# Patient Record
Sex: MALE | Race: WHITE | NOT HISPANIC OR LATINO | URBAN - METROPOLITAN AREA
[De-identification: names, ages, dates, MRNs, and addresses within clinical notes are randomized per-mention and may not be internally consistent; named-entity substitution may affect disease eponyms.]

---

## 2017-05-04 ENCOUNTER — AMBULATORY - HEALTHEAST (OUTPATIENT)
Dept: CARDIOLOGY | Facility: CLINIC | Age: 62
End: 2017-05-04

## 2017-05-05 ENCOUNTER — OFFICE VISIT - HEALTHEAST (OUTPATIENT)
Dept: CARDIOLOGY | Facility: CLINIC | Age: 62
End: 2017-05-05

## 2017-05-05 ENCOUNTER — AMBULATORY - HEALTHEAST (OUTPATIENT)
Dept: CARDIOLOGY | Facility: CLINIC | Age: 62
End: 2017-05-05

## 2017-05-05 ENCOUNTER — COMMUNICATION - HEALTHEAST (OUTPATIENT)
Dept: TELEHEALTH | Facility: CLINIC | Age: 62
End: 2017-05-05

## 2017-05-05 DIAGNOSIS — I48.0 PAROXYSMAL ATRIAL FIBRILLATION (H): ICD-10-CM

## 2017-05-05 DIAGNOSIS — I10 ESSENTIAL HYPERTENSION WITH GOAL BLOOD PRESSURE LESS THAN 140/90: ICD-10-CM

## 2017-05-05 ASSESSMENT — MIFFLIN-ST. JEOR: SCORE: 1819.98

## 2019-04-01 ENCOUNTER — TELEPHONE (OUTPATIENT)
Dept: OTHER | Facility: CLINIC | Age: 64
End: 2019-04-01

## 2019-04-01 NOTE — TELEPHONE ENCOUNTER
4/1/2019      [facility] MEDICARE: ON-BOARDED   ANNUAL WELLNESS/ PHYSICAL: COMPLETED  SPECIALIST: NONE          Outreach ,  David Elise

## 2019-10-22 RX ORDER — MULTIPLE VITAMINS W/ MINERALS TAB 9MG-400MCG
1 TAB ORAL DAILY
Status: SHIPPED | COMMUNITY
Start: 2019-10-22

## 2019-10-22 ASSESSMENT — MIFFLIN-ST. JEOR
SCORE: 1720.19
SCORE: 1720.19

## 2019-10-25 ENCOUNTER — SURGERY - HEALTHEAST (OUTPATIENT)
Dept: SURGERY | Facility: CLINIC | Age: 64
End: 2019-10-25

## 2019-10-25 ENCOUNTER — HOSPITAL ENCOUNTER (OUTPATIENT)
Dept: SURGERY | Facility: CLINIC | Age: 64
Discharge: HOME | End: 2019-10-25
Attending: ORTHOPAEDIC SURGERY | Admitting: ORTHOPAEDIC SURGERY

## 2019-10-25 ENCOUNTER — ANESTHESIA - HEALTHEAST (OUTPATIENT)
Dept: SURGERY | Facility: CLINIC | Age: 64
End: 2019-10-25

## 2019-10-25 DIAGNOSIS — M51.26 LUMBAR HERNIATED DISC: ICD-10-CM

## 2019-10-25 RX ORDER — OXYCODONE AND ACETAMINOPHEN 5; 325 MG/1; MG/1
1 TABLET ORAL EVERY 6 HOURS PRN
Qty: 20 TABLET | Refills: 0 | Status: SHIPPED | OUTPATIENT
Start: 2019-10-25

## 2019-10-25 RX ORDER — ALBUTEROL SULFATE 90 UG/1
2 AEROSOL, METERED RESPIRATORY (INHALATION) EVERY 4 HOURS PRN
Status: SHIPPED | COMMUNITY
Start: 2011-11-01

## 2019-10-25 ASSESSMENT — MIFFLIN-ST. JEOR
SCORE: 1722.46
SCORE: 1722.46

## 2021-05-30 VITALS — BODY MASS INDEX: 27.59 KG/M2 | HEIGHT: 74 IN | WEIGHT: 215 LBS

## 2021-06-02 NOTE — OP NOTE
NAME:  Rico Peguero    DATE OF SERVICE: 10/25/2019    PREOPERATIVE DIAGNOSIS:  Left L5-S1 herniated nucleus pulposus.    POSTOPERATIVE DIAGNOSES:    1.  Left L5-S1 herniated nucleus pulposus.  2.  Axillary L5-S1 disk herniation.    PROCEDURES PERFORMED:  1.  Left L5-S1 microdiskectomy with excision of axillary disk herniation.  2.  Use of operative microscopy.    ATTENDING SURGEON:  Jose De Jesus Parr MD    ASSISTANT:  Todd Holter, PA-C.  Please note this procedure technically required an  assistant for the safety of the patient.  Everton Holter is an experienced PA in spinal  surgery.  His assistance was imperative and necessary to safely protect the  surrounding neural structures as I performed the decompressive phase of the  procedure.    ANESTHESIA:  General endotracheal anesthesia.    ESTIMATED BLOOD LOSS:  10 mL    PACKS AND DRAINS:  None.    COMPLICATIONS:  None.    CONDITION:  Stable.    DISPOSITION:  Postanesthesia care unit.    NARRATIVE:  Mr. Peguero is a pleasant 64-year-old gentleman who previously underwent a  bilateral microdiskectomies by Dr. Maicol Harkins in the past at L4-5.  He did well  from those procedures, recently developed symptoms radiating in the left lower  extremity consistent with the S1 dermatome.  An MRI demonstrated evidence of an  extruded disk herniation on the left at L5-S1.  A trial of conservative care was  attempted but unsuccessful and the patient opted for surgical intervention.  A  detailed description of the risks, benefits, treatment alternatives inherent to the  operation was explained in advance to the patient including but not limited to  failure to improve, cerebrospinal fluid leakage, hemorrhage, infection, permanent or  transient nerve root damage, need for future surgery, adjacent segment disease or  recurrence.  Despite these risks, he elected to proceed.  He was then seen by his  primary care physician and scheduled for surgery.    The patient was met in the preoperative  holding area by myself at the time the  consent was reviewed and signed.  The operative site was marked on the patient's  back.  He was then seen by the anesthesia service and escorted back to the operating  room.  Upon arrival in the operating room, patient was intubated by the anesthesia  service without complication and then flipped from supine to prone position on the  Maury frame with special attention paid to padding bony prominences or superficial  nerves.  His abdomen and genitals were found to be hanging free.  The patient's back  was then prepped and draped in normal sterile fashion.  At this point, a timeout was  called to ensure the proper procedure to be performed as well as the administration  of prophylactic antibiotics.  Once this was completed, the procedure began.  C-arm  fluoroscopic imaging was brought in the lateral plane and with lateral fluoroscopic  imaging, we advanced the spinal needle through the skin to approximately the level  of the L5-S1 disk space.  Once this was confirmed, skin was marked.  The needle was  removed.  We utilized a midline lumbar incision below the previous incision  overlying the L5-S1 interlaminar window. It was carried down through subcutaneous  tissue and underlying fat.  Deep fascia was incised longitudinally in the direction  of its fibers.  The METRx dilation tube system was then utilized to dilate the  paraspinal musculature to 22 mm in diameter and a 22 mm x 5 cm tube was called for  and affixed to the holding arm in standard fashion.  Its position was confirmed via  C-arm as well as the correct operative level, which was confirmed with the radiology  service at [facility].  The C-arm was then removed and the operating microscope was  draped and brought in.  Under operative microscopy, we identified the inferior  border of the hemilamina of L5.  We were able to detach the ligamentum flavum on the  undersurface of the L5 hemilamina and removed this in piecemeal  fashion with a 2 mm  Kerrison progressing cephalad to caudad removing just enough to allow adequate  exposure of the underlying thecal sac and nerve root.  Later in the procedure, a  small laminotomy at L5 was necessary, which was performed with a 2 mm Kerrison as  well.  We were able to sweep aside the epidural fat and localize the thecal sac as  well as the S1 nerve root which had been displaced laterally by the axillary disk  herniation which was semi-adherent to the dura. Utilizing meticulous dissection with  a Penfield retractor and ball-tip probe, we were able to safely remove this from the  axillary location and then remove the herniated disk fragment with a pituitary.   This allowed us to gain access to the lateral border of the thecal sac and we gently  retracted the nerve root in lateral to medial direction exposing the annular rent.   We were able to open this up with a 15 blade and then explored this with a ball-tip  probe.  Varying sizes were placed into the disk space no deeper than their jaws to  facilitate removal of any additional loosened fragments.  We were able to place a  pituitary rongeur into the disk space no deeper than its jaws.  We then irrigated  with normal saline and once again any small fragments of disk were removed.  At this  point, the nerve root regained its suppleness, could be easily transposed at least 1  cm in lateral to medial direction without obstruction.  There is no evidence of any  additional compressive lesion.  We then copiously irrigated with normal saline,  maintained hemostasis with bipolar electrocautery and thrombin paste, then removed  the retractor and closed in layers beginning with the deep fascia which was closed  with #1 Vicryl suture, subcutaneous tissues closed with 2-0 interrupted Vicryl  suture and skin was closed with running subcuticular 4-0 stitch.  The wound was then  cleaned and dried in normal fashion.  Steri-Strips and gauze applied.  The  drapes  were removed.  The patient was transferred from the prone to supine position at  which point he  was extubated by the anesthesia service without complication.  All  needle and sponge counts were correct at the end of the case.      YASMANY MINOR MD  mn  D 10/25/2019 13:25:47  T 10/25/2019 14:32:37  R 10/25/2019 14:32:37  [id number]        cc: YASMANY GANN MD

## 2021-06-02 NOTE — INTERVAL H&P NOTE
I have performed an assessment of the patient, as necessary, to update the patient's current status that may have changed since the prior History and Physical.  The History & Physical has been reviewed and no updates are needed.

## 2021-06-02 NOTE — PROGRESS NOTES
Pharmacy Note - Admission Medication History  Pertinent Provider Information: None   ______________________________________________________________________  Prior To Admission (PTA) med list completed and updated in EMR.     PTA Med List   Medication Sig Last Dose     albuterol (PROAIR HFA;PROVENTIL HFA;VENTOLIN HFA) 90 mcg/actuation inhaler Inhale 2 puffs every 4 (four) hours as needed for allergies. More than a month at Unknown time     amLODIPine (NORVASC) 5 MG tablet Take 5 mg by mouth daily. 10/24/2019 at Unknown time     aspirin 81 MG EC tablet Take 1 tablet (81 mg total) by mouth daily. 10/19/2019 at Unknown time     losartan-hydrochlorothiazide (HYZAAR) 100-25 mg per tablet Take 1 tablet by mouth daily. 10/24/2019 at Unknown time     multivitamin with minerals (THERA-M) 9 mg iron-400 mcg Tab tablet Take 1 tablet by mouth daily. Past Week at Unknown time     traZODone (DESYREL) 50 MG tablet Take  mg by mouth at bedtime.        10/24/2019 at Unknown time       Information source(s): Patient and Clinic records  Patient was asked about OTC/herbal products specifically.  PTA med list reflects this.  Based on the pharmacist s assessment, the PTA med list information appears reliable  Allergies were reviewed, assessed, and updated with the patient.    Patient does not use any multi-dose medications prior to admission.   Thank you for the opportunity to participate in the care of this patient.    Dillon Mckeon, PharmD     10/25/2019     9:42 AM

## 2021-06-02 NOTE — ANESTHESIA CARE TRANSFER NOTE
Last vitals:   Vitals:    10/25/19 1310   BP: 134/80   Pulse: (!) 102   Resp: 18   Temp: 36.6  C (97.9  F)   SpO2: 100%     Patient's level of consciousness is drowsy  Spontaneous respirations: yes  Maintains airway independently: yes  Dentition unchanged: yes  Oropharynx: oropharynx clear of all foreign objects    QCDR Measures:  ASA# 20 - Surgical Safety Checklist: WHO surgical safety checklist completed prior to induction    PQRS# 430 - Adult PONV Prevention: 4558F - Pt received => 2 anti-emetic agents (different classes) preop & intraop  ASA# 8 - Peds PONV Prevention: NA - Not pediatric patient, not GA or 2 or more risk factors NOT present  PQRS# 424 - Tara-op Temp Management: 4559F - At least one body temp DOCUMENTED => 35.5C or 95.9F within required timeframe  PQRS# 426 - PACU Transfer Protocol: - Transfer of care checklist used  ASA# 14 - Acute Post-op Pain: ASA14B - Patient did NOT experience pain >= 7 out of 10

## 2021-06-02 NOTE — BRIEF OP NOTE
Brief Operative Note    Name:  Rico Peguero  Location: [facility] Main OR  Procedure Date:  10/25/2019  PCP:  Rico Balderas MD      LEFT LUMBAR 5 SACRAL 1 MINIMALLY INVASIVE MICRODISCECTOMY (Left)    Pre-Procedure Diagnosis:    Acute low back pain [M54.5]     Post-Procedure Diagnosis:    * No post-op diagnosis entered *    Surgeon(s):  Jose De Jesus Parr MD    Findings:    axillary  HNP    Estimated Blood Loss:   10 mL from 10/25/2019 11:52 AM to 10/25/2019  1:01 PM    Specimens:    * No specimens in log *       Drains:        Implants:  * No implants in log *    Complications:    None    Jose De Jesus Parr     Date: 10/25/2019  Time: 1:01 PM

## 2021-06-02 NOTE — ANESTHESIA PREPROCEDURE EVALUATION
Anesthesia Evaluation        Airway   Mallampati: II  Neck ROM: full   Pulmonary     breath sounds clear to auscultation  (+) sleep apnea on no CPAP, ,   (-) asthma    ROS comment: Albuterol for cat allergies                         Cardiovascular   (+) hypertension, dysrhythmias, ,     (-) past MI, CAD, CABG/stent, angina, hypercholesterolemia  Rhythm: regular  Rate: normal,         Neuro/Psych    (+) neuromuscular disease,    (-) no seizures, no CVA    Comments: Sciatica left leg    Endo/Other - negative ROS      GI/Hepatic/Renal    (-) renal disease          Dental - normal exam                        Anesthesia Plan  Planned anesthetic: general endotracheal    ASA 3   Induction: intravenous   Anesthetic plan and risks discussed with: patient    Post-op plan: routine recovery

## 2021-06-02 NOTE — OR NURSING
Level confirmed by radiologist Dr. Carlin at 1228 to be at Lumbar 5-Sacral 1, consistent with surgical consent.

## 2021-06-02 NOTE — ANESTHESIA POSTPROCEDURE EVALUATION
Patient: Rico Peguero  LEFT LUMBAR 5 SACRAL 1 MINIMALLY INVASIVE MICRODISCECTOMY  Anesthesia type: general    Patient location: PACU  Last vitals:   Vitals Value Taken Time   /83 10/25/2019  2:00 PM   Temp 36.6  C (97.9  F) 10/25/2019  1:30 PM   Pulse 77 10/25/2019  2:12 PM   Resp 16 10/25/2019  2:00 PM   SpO2 96 % 10/25/2019  2:12 PM   Vitals shown include unvalidated device data.  Post vital signs: stable  Level of consciousness: awake and responds to simple questions  Post-anesthesia pain: pain controlled  Post-anesthesia nausea and vomiting: no  Pulmonary: unassisted, return to baseline  Cardiovascular: stable and blood pressure at baseline  Hydration: adequate  Anesthetic events: no    QCDR Measures:  ASA# 11 - Tara-op Cardiac Arrest: ASA11B - Patient did NOT experience unanticipated cardiac arrest  ASA# 12 - Tara-op Mortality Rate: ASA12B - Patient did NOT die  ASA# 13 - PACU Re-Intubation Rate: ASA13B - Patient did NOT require a new airway mgmt  ASA# 10 - Composite Anes Safety: ASA10A - No serious adverse event    Additional Notes:

## 2021-06-03 VITALS
WEIGHT: 193.5 LBS | BODY MASS INDEX: 24.83 KG/M2 | WEIGHT: 193.5 LBS | HEIGHT: 74 IN | BODY MASS INDEX: 24.83 KG/M2 | HEIGHT: 74 IN

## 2021-06-10 NOTE — PROGRESS NOTES
[facility]    Assessment/Plan:  Diagnoses and all orders for this visit:    Paroxysmal atrial fibrillation and now 6 years out from repeat ablation.  He has only had seconds where he feels a faster heart rate and then back in regular rhythm.  [id number] score of 1 and on aspirin full-strength aspirin.  To decrease to aspirin 81 mg p.o. daily.  To call if symptoms of recurrence of atrial fib.  Will follow in clinic as needed only.  -     aspirin 81 MG EC tablet; Take 1 tablet (81 mg total) by mouth daily.; Refill: 0    Essential hypertension with goal blood pressure less than 140/90 well-controlled.    ______________________________________________________________________    Subjective:    I had the opportunity to see Rico Peguero at the [facility]. Rico Peguero is a 62 y.o. male and here for EP follow-up regarding paroxysmal atrial fibrillation.  Rico Peguero has a known history of paroxysmal atrial fib and had pulmonary vein isolation ablation in 2005 and then repeat ablation in 2011.  He is done well since.  He denies any cardiac symptoms on questioning.  He was never able to tolerate CPAP but continues to sleep on his side or stomach.  ______________________________________________________________________    Problem List:  Patient Active Problem List   Diagnosis     Asthma     Obstructive Sleep Apnea     Essential hypertension     Paroxysmal Atrial Fibrillation     Medical History:  Past Medical History:   Diagnosis Date     Asthma     Created by Conversion      Hypertension     Created by Conversion      Obstructive Sleep Apnea     Positive sleep study in 2006 and didn't shmuel CPAP. Mostly side sleeping/       Paroxysmal Atrial Fibrillation     [id number] - VASc risk score = 1 (HTN)  Symptomatic. Failed rhythmol, amiodarone and sotalol in past.  PVI in 2005  Redo PVI Feb 2011(PVI +JODEE + CTI) Off AAD / warfain May 2011      Surgical History:  Past Surgical History:  "  Procedure Laterality Date     BACK SURGERY  10/2015    Initial surgery Nov 2014     NC ABLATE HEART DYSRHYTHM FOCUS      Description: Catheter Ablation Atrial Fibrillation;  Recorded: 04/22/2013;  Comments: 2005 non irrigated RF + SVC; ; Repeat PVI Feb 2011 (PVI reisolation + JODEE + roof + CTI lines)     Social History:  Social History   Substance Use Topics     Smoking status: Never Smoker     Smokeless tobacco: None     Alcohol use Yes      Comment: 2 drinks per month        Review of Systems: Review of Systems:   General: WNL  Eyes: WNL  Ears/Nose/Throat: WNL  Lungs: WNL  Heart: WNL  Stomach: WNL  Bladder: WNL  Muscle/Joints: WNL  Skin: WNL  Nervous System: WNL  Mental Health: WNL     Blood: WNL      Family History:  Family History   Problem Relation Age of Onset     Rheum arthritis Mother      Cirrhosis Father      Heart failure Father      Atrial fibrillation Sister      Atrial fibrillation Brother      Hypertension Brother          Allergies:  No Known Allergies  Medications:  Current Outpatient Prescriptions   Medication Sig Dispense Refill     amLODIPine (NORVASC) 5 MG tablet Take 5 mg by mouth daily.       losartan-hydrochlorothiazide (HYZAAR) 100-25 mg per tablet Take 1 tablet by mouth daily.       traZODone (DESYREL) 50 MG tablet Take  mg by mouth bedtime as needed for sleep.       aspirin 81 MG EC tablet Take 1 tablet (81 mg total) by mouth daily.  0     No current facility-administered medications for this visit.        Objective:   Vital signs:  /74 (Patient Site: Left Arm, Patient Position: Sitting, Cuff Size: Adult Regular)  Pulse 80  Resp 16  Ht 6' 2\" (1.88 m)  Wt 215 lb (97.5 kg)  BMI 27.6 kg/m2      Physical Exam:    GENERAL APPEARANCE: Alert, cooperative and in no acute distress.  HEENT: No scleral icterus. No Xanthelasma. Oral mucuos membranes pink and moist.  NECK: JVP Nl.   CHEST: clear to auscultation  CARDIOVASCULAR: S1, S2 without murmur ,clicks or rubs. Regular, regular.  " Radial and posterior tibial pulses are intact and symetric.   EXTREMITIES: No cyanosis, clubbing or edema.    Results personally reviewed:   2/11 MARTA EF 70%. no sign valve dis. LA mod dil. No thrombus.        Results for orders placed or performed in visit on 05/12/15   ECG 12 lead with MUSE   Result Value Ref Range    SYSTOLIC BLOOD PRESSURE  mmHg    DIASTOLIC BLOOD PRESSURE  mmHg    VENTRICULAR RATE 81 BPM    ATRIAL RATE 81 BPM    P-R INTERVAL 156 ms    QRS DURATION 88 ms    Q-T INTERVAL 382 ms    QTC CALCULATION (BEZET) 443 ms    P Axis 53 degrees    R AXIS 70 degrees    T AXIS 57 degrees    MUSE DIAGNOSIS       Normal sinus rhythm  Normal ECG  When compared with ECG of 18-FEB-2011 10:07,  No significant change was found  Confirmed by GUILLERMINA GODDARD MD LOC: ([id number]) on 5/12/2015 4:28:25 PM     TSH: No results found for: TSH  BNP: No results found for: BNP  BMP:  Lab Results   Component Value Date    CREATININE 0.94 02/07/2011    BUN 15 02/07/2011     02/10/2011    K 4.0 02/10/2011     02/10/2011    CO2 22 (L) 02/10/2011       This note has been dictated using voice recognition software. Any grammatical or context distortions are unintentional and inherent to the software.    BRITNEY ESTEVES RN, [facility]  [phone]

## 2021-06-20 ENCOUNTER — HEALTH MAINTENANCE LETTER (OUTPATIENT)
Age: 66
End: 2021-06-20

## 2021-10-10 ENCOUNTER — HEALTH MAINTENANCE LETTER (OUTPATIENT)
Age: 66
End: 2021-10-10

## 2022-07-16 ENCOUNTER — HEALTH MAINTENANCE LETTER (OUTPATIENT)
Age: 67
End: 2022-07-16

## 2022-09-18 ENCOUNTER — HEALTH MAINTENANCE LETTER (OUTPATIENT)
Age: 67
End: 2022-09-18

## 2023-07-30 ENCOUNTER — HEALTH MAINTENANCE LETTER (OUTPATIENT)
Age: 68
End: 2023-07-30